# Patient Record
Sex: FEMALE | Race: WHITE
[De-identification: names, ages, dates, MRNs, and addresses within clinical notes are randomized per-mention and may not be internally consistent; named-entity substitution may affect disease eponyms.]

---

## 2017-06-08 ENCOUNTER — APPOINTMENT (OUTPATIENT)
Dept: ULTRASOUND IMAGING | Facility: CLINIC | Age: 77
End: 2017-06-08

## 2017-06-08 ENCOUNTER — OUTPATIENT (OUTPATIENT)
Dept: OUTPATIENT SERVICES | Facility: HOSPITAL | Age: 77
LOS: 1 days | End: 2017-06-08

## 2025-06-18 ENCOUNTER — EMERGENCY (EMERGENCY)
Facility: HOSPITAL | Age: 85
LOS: 1 days | End: 2025-06-18
Attending: STUDENT IN AN ORGANIZED HEALTH CARE EDUCATION/TRAINING PROGRAM
Payer: MEDICARE

## 2025-06-18 VITALS
SYSTOLIC BLOOD PRESSURE: 134 MMHG | RESPIRATION RATE: 17 BRPM | HEART RATE: 75 BPM | TEMPERATURE: 98 F | DIASTOLIC BLOOD PRESSURE: 78 MMHG | OXYGEN SATURATION: 95 %

## 2025-06-18 PROCEDURE — 70450 CT HEAD/BRAIN W/O DYE: CPT | Mod: 26

## 2025-06-18 PROCEDURE — 99284 EMERGENCY DEPT VISIT MOD MDM: CPT | Mod: FS

## 2025-06-18 PROCEDURE — 72125 CT NECK SPINE W/O DYE: CPT

## 2025-06-18 PROCEDURE — 99284 EMERGENCY DEPT VISIT MOD MDM: CPT | Mod: 25

## 2025-06-18 PROCEDURE — 70450 CT HEAD/BRAIN W/O DYE: CPT

## 2025-06-18 PROCEDURE — 72125 CT NECK SPINE W/O DYE: CPT | Mod: 26

## 2025-06-18 RX ORDER — ACETAMINOPHEN 500 MG/5ML
650 LIQUID (ML) ORAL ONCE
Refills: 0 | Status: COMPLETED | OUTPATIENT
Start: 2025-06-18 | End: 2025-06-18

## 2025-06-18 RX ADMIN — Medication 650 MILLIGRAM(S): at 20:22

## 2025-06-18 NOTE — ED PROVIDER NOTE - PHYSICAL EXAMINATION
General: non-toxic appearing, in no acute distress, A+Ox3  HENT: normocephalic. TM clear b/l, no mastoid/scalp or facial ttp, small hematoma to L frontal region, nasal bone without deviation, teeth intact  Eyes: pink conjunctivae, EOMI, PERRLA  CV: RRR, nl s1/s2.   Resp: CTAB, normal rate and effort  GI: Abdomen soft, NT, ND, no guarding  Neuro: CN II-XII intact, sensorimotor intact without deficits   MSK: No spine or joint tenderness to palpation, Full ROM and strength ext x 4. Normal Gait  Skin: No rashes, lacerations, abrasions, ecchymosis, swelling. intact and perfused.

## 2025-06-18 NOTE — ED ADULT NURSE NOTE - NSFALLRISKINTERV_ED_ALL_ED

## 2025-06-18 NOTE — ED PROVIDER NOTE - CLINICAL SUMMARY MEDICAL DECISION MAKING FREE TEXT BOX
86yo F presented for eval s/p falling down as 3 wooden stairs collapsed. positive head trauma, no LOC, NV or head pain. reports mild L arm and L knee pain. on eval, pt appeared well and in no acute distress, small hematoma appreciated on L side frontal region, ambulatory in ED, FROM of all ext, no midline or joint ttp, no focal deficits, chest wall and abdomen benign, remainder of PE WNL. VSS. ordered CT head and neck given age and finding of scalp hematoma. also ordered tylenol    discussed supportive care measures and return precautions. advised to f/u with pcp. pt verbalized understanding and agreement 84yo F presented for eval s/p falling down as 3 wooden stairs collapsed. positive head trauma, no LOC, NV or head pain. reports mild L arm and L knee pain. on eval, pt appeared well and in no acute distress, small hematoma appreciated on L side frontal region, ambulatory in ED, FROM of all ext, no midline or joint ttp, no focal deficits, chest wall and abdomen benign, remainder of PE WNL. VSS. ordered CT head and neck given age and finding of scalp hematoma. also ordered tylenol    CT neg for acute traumatic findings. reviewed results with pt and family member. discussed supportive care measures and return precautions. advised to f/u with pcp. pt verbalized understanding and agreement

## 2025-06-18 NOTE — ED ADULT NURSE NOTE - OBJECTIVE STATEMENT
Patient was stepping down a small set of stairs when they collapsed and she fell forward and to the side down about 3 steps hitting her left shoulder and left side of temple/head. Patient Aox4, complaining of very mild pain, ambulatory, moves all extremities, pupils +3 perrla. No other complaints

## 2025-06-18 NOTE — ED ADULT TRIAGE NOTE - CHIEF COMPLAINT QUOTE
pt ALVARO from [place], [place] & she fell. c/o L arm pain

## 2025-06-18 NOTE — ED PROVIDER NOTE - ATTENDING APP SHARED VISIT CONTRIBUTION OF CARE
85-year-old female with a past medical history of hypertension, hyperlipidemia, GERD, RA presents status post fall.  Patient states she was at a rental in \[facility]\"" walked down some steps one of them was loose which caused her to fall onto her left side she hit her left head and arm.  Patient reports no pain to the arm has been ambulatory since accident but noticed a hematoma to her left head.  Patient denies LOC otherwise well-appearing.   A&Ox3, moving all extremities symmetrically, follows commands, motor mickey upper and lower ext 5/5, sensory symm and intact CN 2-12 grossly intact, no ataxia, no nystagmus, no dysmetria, no ddk, symm mickey, no pronator drift    Patient with no acute findings on CT stable for discharge instructed follow-up and given return precautions

## 2025-06-18 NOTE — ED PROVIDER NOTE - PATIENT PORTAL LINK FT
You can access the FollowMyHealth Patient Portal offered by [facility] by registering at the following website: [email or web address]. By joining [email or web address]’s FollowMyHealth portal, you will also be able to view your health information using other applications (apps) compatible with our system.

## 2025-06-18 NOTE — ED PROVIDER NOTE - OBJECTIVE STATEMENT
84yo F pmh HTN, HLD, RA, presents to ED for evaluation s/p fall which occurred 2-3h ago. pt reports she is staying at [facility] [place] which has an outside deck with 3 wooden steps, while walking down the stairs, the entire wooden stairs collapsed and she fell onto the ground, which was tiles made of stone. pt reports she landed on her L side and then hit the left side of her head on the ground. pt notes she has mild L knee and arm pain but "ultimately feels fine". she notes she is able to ambulate and move all extremities without difficulty. she denies LOC, confusion, amnesia, change in vision, photophobia, headache, neck pain, back pain, urinary incont, numbness, abdominal pain, NV, chest pain, palpitations, sob